# Patient Record
Sex: FEMALE | Race: WHITE | NOT HISPANIC OR LATINO | URBAN - METROPOLITAN AREA
[De-identification: names, ages, dates, MRNs, and addresses within clinical notes are randomized per-mention and may not be internally consistent; named-entity substitution may affect disease eponyms.]

---

## 2024-04-05 ENCOUNTER — OFFICE VISIT (OUTPATIENT)
Dept: CARDIOLOGY | Facility: CLINIC | Age: 44
End: 2024-04-05
Payer: COMMERCIAL

## 2024-04-05 VITALS
WEIGHT: 220 LBS | HEART RATE: 89 BPM | BODY MASS INDEX: 37.56 KG/M2 | DIASTOLIC BLOOD PRESSURE: 108 MMHG | HEIGHT: 64 IN | SYSTOLIC BLOOD PRESSURE: 158 MMHG

## 2024-04-05 DIAGNOSIS — R00.2 PALPITATIONS: Primary | ICD-10-CM

## 2024-04-05 DIAGNOSIS — R03.0 ELEVATED BLOOD PRESSURE READING: ICD-10-CM

## 2024-04-05 PROCEDURE — 93000 ELECTROCARDIOGRAM COMPLETE: CPT | Performed by: INTERNAL MEDICINE

## 2024-04-05 PROCEDURE — 1036F TOBACCO NON-USER: CPT | Performed by: INTERNAL MEDICINE

## 2024-04-05 PROCEDURE — 99204 OFFICE O/P NEW MOD 45 MIN: CPT | Performed by: INTERNAL MEDICINE

## 2024-04-05 RX ORDER — SEMAGLUTIDE 2.4 MG/.75ML
2.4 INJECTION, SOLUTION SUBCUTANEOUS
COMMUNITY
Start: 2024-02-13

## 2024-04-05 RX ORDER — NIRMATRELVIR AND RITONAVIR 300-100 MG
KIT ORAL
COMMUNITY
Start: 2023-12-17 | End: 2024-04-05 | Stop reason: WASHOUT

## 2024-04-05 ASSESSMENT — ENCOUNTER SYMPTOMS
DEPRESSION: 0
OCCASIONAL FEELINGS OF UNSTEADINESS: 0
LOSS OF SENSATION IN FEET: 0

## 2024-04-05 NOTE — PROGRESS NOTES
"Chief Complaint:   New Patient Visit (Heart flutters)     History Of Present Illness:    Nataliia Rothman is a 43 y.o. female presenting with fluttering in the chest going on for 2 months.  She states that she had checked her pulse and it would appear normal.  She would have to cough to make it go away.  Feels like the heart flip-flops.  Quite random episodes.    No family history of heart disease although grandparents had valvular heart disease.    She does not have chest pain or shortness of breath with activities.    She has elevated blood pressure today however previously blood pressure was well-controlled, in fact normal when she went to see her PCP not too long ago.  Reviewed labs done at outside facility, favorable lipid profile normal BMP CBC, TSH- labs were done on 3/7/2024 at [facility]..     Last Recorded Vitals:  Vitals:    04/05/24 1015 04/05/24 1026   BP: (!) 152/103 (!) 158/108   BP Location: Left arm Right arm   Pulse: 89    Weight: 99.8 kg (220 lb)    Height: 1.626 m (5' 4\")        Past Medical History:  She has no past medical history on file.    Past Surgical History:  She has no past surgical history on file.      Social History:  She reports that she has never smoked. She has never been exposed to tobacco smoke. She has never used smokeless tobacco. No history on file for alcohol use and drug use.    Family History:  Family History   Problem Relation Name Age of Onset    Diabetes Mother          Allergies:  Penicillins    Outpatient Medications:  Current Outpatient Medications   Medication Instructions    Wegovy 2.4 mg, oral, Once Weekly       Physical Exam:  Physical Exam  Vitals reviewed.   Constitutional:       Appearance: Normal appearance.   Neck:      Vascular: No carotid bruit or JVD.   Cardiovascular:      Rate and Rhythm: Normal rate and regular rhythm.      Pulses: Normal pulses.      Heart sounds: Normal heart sounds, S1 normal and S2 normal.   Pulmonary:      Effort: Pulmonary effort " "is normal. No respiratory distress.      Breath sounds: No wheezing or rales.   Abdominal:      General: Abdomen is flat.      Palpations: Abdomen is soft.   Musculoskeletal:      Right lower leg: No edema.      Left lower leg: No edema.   Skin:     General: Skin is warm.   Neurological:      Mental Status: She is alert and oriented to person, place, and time. Mental status is at baseline.   Psychiatric:         Mood and Affect: Mood normal.         Behavior: Behavior normal.           Last Labs:  CBC -  No results found for: \"WBC\", \"HGB\", \"HCT\", \"MCV\", \"PLT\"    CMP -  No results found for: \"CALCIUM\", \"PHOS\", \"PROT\", \"ALBUMIN\", \"AST\", \"ALT\", \"ALKPHOS\", \"BILITOT\"    LIPID PANEL -   No results found for: \"CHOL\", \"TRIG\", \"HDL\", \"CHHDL\", \"LDLF\", \"VLDL\", \"NHDL\"    RENAL FUNCTION PANEL -   No results found for: \"GLUCOSE\", \"NA\", \"K\", \"CL\", \"CO2\", \"ANIONGAP\", \"BUN\", \"CREATININE\", \"GFRMALE\", \"CALCIUM\", \"PHOS\", \"ALBUMIN\"     No results found for: \"BNP\", \"HGBA1C\"    Last Cardiology Tests:  ECG:  No results found for this or any previous visit from the past 1095 days.      Echo:  No results found for this or any previous visit from the past 1095 days.      Ejection Fractions:  No results found for: \"EF\"    Cath:  No results found for this or any previous visit from the past 1095 days.      Stress Test:  No results found for this or any previous visit from the past 1095 days.      Cardiac Imaging:  No results found for this or any previous visit from the past 1095 days.          Assessment/Plan   1-palpitations-symptoms consistent with PACs or PVCs we will arrange for a 14-day monitor and echocardiogram to rule out structural heart disease.  TSH is normal.  Physical exam is normal.    2-elevated blood pressure without diagnosis of hypertension-advised to self monitor blood pressure and bring log at the next visit.  If remains elevated may require treatment in near future.      Stephanie Soto MD  "

## 2024-04-25 ENCOUNTER — HOSPITAL ENCOUNTER (OUTPATIENT)
Dept: CARDIOLOGY | Facility: HOSPITAL | Age: 44
Discharge: HOME | End: 2024-04-25
Payer: COMMERCIAL

## 2024-04-25 DIAGNOSIS — R03.0 ELEVATED BLOOD PRESSURE READING: ICD-10-CM

## 2024-04-25 DIAGNOSIS — R00.2 PALPITATIONS: ICD-10-CM

## 2024-04-25 LAB
AORTIC VALVE MEAN GRADIENT: 6.8 MMHG
AORTIC VALVE PEAK VELOCITY: 1.76 M/S
AV PEAK GRADIENT: 12.3 MMHG
AVA (PEAK VEL): 1.76 CM2
AVA (VTI): 1.83 CM2
EJECTION FRACTION APICAL 4 CHAMBER: 64.3
LEFT ATRIUM VOLUME AREA LENGTH INDEX BSA: 25.1 ML/M2
LEFT VENTRICLE INTERNAL DIMENSION DIASTOLE: 4.59 CM (ref 3.5–6)
LEFT VENTRICULAR OUTFLOW TRACT DIAMETER: 1.95 CM
LV EJECTION FRACTION BIPLANE: 66 %
MITRAL VALVE E/A RATIO: 0.94
MITRAL VALVE E/E' RATIO: 8.71
RIGHT VENTRICLE FREE WALL PEAK S': 15.41 CM/S
TRICUSPID ANNULAR PLANE SYSTOLIC EXCURSION: 2.6 CM

## 2024-04-25 PROCEDURE — 93306 TTE W/DOPPLER COMPLETE: CPT | Performed by: INTERNAL MEDICINE

## 2024-04-25 PROCEDURE — 93306 TTE W/DOPPLER COMPLETE: CPT

## 2024-04-26 ENCOUNTER — ANCILLARY PROCEDURE (OUTPATIENT)
Dept: CARDIOLOGY | Facility: CLINIC | Age: 44
End: 2024-04-26
Payer: COMMERCIAL

## 2024-04-26 DIAGNOSIS — R00.2 PALPITATIONS: ICD-10-CM

## 2024-04-26 DIAGNOSIS — R03.0 ELEVATED BLOOD PRESSURE READING: ICD-10-CM

## 2024-04-26 PROCEDURE — 93248 EXT ECG>7D<15D REV&INTERPJ: CPT | Performed by: INTERNAL MEDICINE

## 2024-04-26 PROCEDURE — 93246 EXT ECG>7D<15D RECORDING: CPT | Performed by: INTERNAL MEDICINE

## 2024-04-29 NOTE — PROGRESS NOTES
"Primary Cardiologist: Dr. Soto    Chief Complaint:   Follow-up     History Of Present Illness:    Nataliia Rothman is a 43 y.o. female with past medical history of elevated blood pressure and palpitations who presents for follow up after 14 day Holter and echocardiogram.     Today, Nataliia Rothman is feeling well, she reports the palpitations that she was having have subsided, no cardiac concerns today.   Denies chest pain/pressure, no dizziness or lightheadedness, no shortness of breath, and no palpitations. She denies lower extremity edema, orthopnea or PND.     Last Recorded Vitals:  Visit Vitals  BP (!) 140/120 (BP Location: Left arm)   Pulse 98   Ht 1.626 m (5' 4\")   Wt 98.9 kg (218 lb)   SpO2 99%   BMI 37.42 kg/m²   Smoking Status Never   BSA 2.11 m²        Past Medical History:  She has no past medical history on file.    Past Surgical History:  She has no past surgical history on file.      Social History:  She reports that she has never smoked. She has never been exposed to tobacco smoke. She has never used smokeless tobacco. No history on file for alcohol use and drug use.    Family History:  Family History   Problem Relation Name Age of Onset    Diabetes Mother          Allergies:  Penicillins and Shellfish derived    Outpatient Medications:  Current Outpatient Medications   Medication Instructions    amLODIPine (NORVASC) 2.5 mg, oral, Daily    miSOPROStoL (Cytotec) 200 mcg tablet PLEASE SEE ATTACHED FOR DETAILED DIRECTIONS    Wegovy 2.4 mg, oral, Once Weekly         Review of Systems   Constitutional: Negative for malaise/fatigue.   HENT: Negative.     Eyes: Negative.    Cardiovascular:  Negative for chest pain, dyspnea on exertion, leg swelling, near-syncope, palpitations and syncope.   Respiratory:  Negative for shortness of breath.    Endocrine: Negative.    Hematologic/Lymphatic: Negative.    Skin: Negative.    Musculoskeletal: Negative.    Gastrointestinal: Negative.    Genitourinary: " "Negative.    Neurological:  Negative for dizziness and light-headedness.   Psychiatric/Behavioral: Negative.          Physical Exam  Vitals reviewed.   Constitutional:       Appearance: Normal appearance.   HENT:      Head: Normocephalic.      Mouth/Throat:      Mouth: Mucous membranes are moist.   Cardiovascular:      Rate and Rhythm: Normal rate and regular rhythm.      Pulses: Normal pulses.      Heart sounds: Normal heart sounds. No murmur heard.     No friction rub. No gallop.   Pulmonary:      Effort: Pulmonary effort is normal.      Breath sounds: Normal breath sounds. No wheezing, rhonchi or rales.   Abdominal:      General: Bowel sounds are normal.      Palpations: Abdomen is soft.   Musculoskeletal:         General: Normal range of motion.      Cervical back: Normal range of motion.      Right lower leg: No edema.      Left lower leg: No edema.   Skin:     General: Skin is warm and dry.   Neurological:      General: No focal deficit present.      Mental Status: She is alert and oriented to person, place, and time.   Psychiatric:         Mood and Affect: Mood normal.         Behavior: Behavior normal.         Thought Content: Thought content normal.         Judgment: Judgment normal.           Last Labs:  CBC -  No results found for: \"WBC\", \"HGB\", \"HCT\", \"MCV\", \"PLT\"    CMP -  No results found for: \"CALCIUM\", \"PHOS\", \"PROT\", \"ALBUMIN\", \"AST\", \"ALT\", \"ALKPHOS\", \"BILITOT\"    LIPID PANEL -   No results found for: \"CHOL\", \"TRIG\", \"HDL\", \"CHHDL\", \"LDLF\", \"VLDL\", \"NHDL\"    RENAL FUNCTION PANEL -   No results found for: \"GLUCOSE\", \"NA\", \"K\", \"CL\", \"CO2\", \"ANIONGAP\", \"BUN\", \"CREATININE\", \"GFRMALE\", \"CALCIUM\", \"PHOS\", \"ALBUMIN\"     No results found for: \"BNP\", \"HGBA1C\"    Last Cardiology Tests:  ECG:      Holter monitor: Patient wore monitor for 12 days and 20 hours starting on 4/6/2024 and ending on 4/19/2024: Patient had a minimum heart rate of 52 bpm, maximum heart rate of 174 bpm and average heart rate of 79 " "bpm.  Predominant underlying rhythm was sinus rhythm.  2 SVT runs occurred, the run with the fastest interval lasting 12 beats with a max rate of 174 bpm.  The run with the fastest interval was also the longest.  Isolated SVE's were rare SVE couplets were rare and SVE triplets were rare.  Isolated VE's were rare VE couplets were rare and VE triplets were rare.  Echo:  TTE 4/25/24:   Left ventricular systolic function is normal with a 60% estimated ejection fraction.     Ejection Fractions:  No results found for: \"EF\"    Cath:  No results found for this or any previous visit from the past 1095 days.      Stress Test:      Cardiac Imaging:  No results found for this or any previous visit from the past 1095 days.        Lab review: I have personally reviewed the laboratory result(s)     Assessment/Plan       Palpitations/pSVT  Holter with 2 episodes of pSVT, triggered events correlate with SR  Echo with normal LV systolic function  Today, denies palpitations today, stable.       Elevated blood pressure  Today's BP is elevated and reports that her home readings are usually running with systolic's 120-140s.   Will start on amlodipine 2.5 mg  today.   She will continue to take blood pressure readings at home and send a message with two weeks of readings after starting on amlodipine.     Ghada Villanueva, APRN-CNP   "

## 2024-05-01 ENCOUNTER — OFFICE VISIT (OUTPATIENT)
Dept: CARDIOLOGY | Facility: CLINIC | Age: 44
End: 2024-05-01
Payer: COMMERCIAL

## 2024-05-01 VITALS
HEART RATE: 98 BPM | HEIGHT: 64 IN | OXYGEN SATURATION: 99 % | SYSTOLIC BLOOD PRESSURE: 140 MMHG | WEIGHT: 218 LBS | BODY MASS INDEX: 37.22 KG/M2 | DIASTOLIC BLOOD PRESSURE: 120 MMHG

## 2024-05-01 DIAGNOSIS — R00.2 PALPITATIONS: Primary | ICD-10-CM

## 2024-05-01 DIAGNOSIS — I10 ESSENTIAL HYPERTENSION, BENIGN: ICD-10-CM

## 2024-05-01 PROCEDURE — 3080F DIAST BP >= 90 MM HG: CPT | Performed by: CLINICAL NURSE SPECIALIST

## 2024-05-01 PROCEDURE — 3077F SYST BP >= 140 MM HG: CPT | Performed by: CLINICAL NURSE SPECIALIST

## 2024-05-01 PROCEDURE — 99213 OFFICE O/P EST LOW 20 MIN: CPT | Performed by: CLINICAL NURSE SPECIALIST

## 2024-05-01 RX ORDER — MISOPROSTOL 200 UG/1
TABLET ORAL
COMMUNITY
Start: 2024-04-25

## 2024-05-01 RX ORDER — AMLODIPINE BESYLATE 2.5 MG/1
2.5 TABLET ORAL DAILY
Qty: 30 TABLET | Refills: 3 | Status: SHIPPED | OUTPATIENT
Start: 2024-05-01 | End: 2024-05-09 | Stop reason: SDUPTHER

## 2024-05-01 ASSESSMENT — ENCOUNTER SYMPTOMS
DIZZINESS: 0
HEMATOLOGIC/LYMPHATIC NEGATIVE: 1
DYSPNEA ON EXERTION: 0
PSYCHIATRIC NEGATIVE: 1
ENDOCRINE NEGATIVE: 1
LIGHT-HEADEDNESS: 0
EYES NEGATIVE: 1
SHORTNESS OF BREATH: 0
GASTROINTESTINAL NEGATIVE: 1
NEAR-SYNCOPE: 0
MUSCULOSKELETAL NEGATIVE: 1
PALPITATIONS: 0
SYNCOPE: 0

## 2024-05-01 NOTE — PATIENT INSTRUCTIONS
A new medication has been ordered for you to take daily.   Please start taking amlodipine 2.5 mg daily for your elevated blood pressure.  Take your blood pressure at home and send us a message in 2 weeks with your blood pressure readings.   Goal blood pressure would be for the top number to be less than 130.   Call our office with any new cardiac concerns  Continue current medications  Continue heart-healthy diet. A diet low in sodium, low in cholesterol, limiting red meats and eating whole foods.   Follow up with Dr. Soto in one year

## 2024-05-09 DIAGNOSIS — I10 ESSENTIAL HYPERTENSION, BENIGN: ICD-10-CM

## 2024-05-09 RX ORDER — AMLODIPINE BESYLATE 2.5 MG/1
2.5 TABLET ORAL 2 TIMES DAILY
Qty: 180 TABLET | Refills: 1 | Status: SHIPPED | OUTPATIENT
Start: 2024-05-09 | End: 2024-11-05

## 2024-05-30 ENCOUNTER — APPOINTMENT (OUTPATIENT)
Dept: CARDIOLOGY | Facility: CLINIC | Age: 44
End: 2024-05-30
Payer: COMMERCIAL

## 2024-08-23 DIAGNOSIS — I10 ESSENTIAL HYPERTENSION, BENIGN: ICD-10-CM

## 2024-09-16 RX ORDER — AMLODIPINE BESYLATE 2.5 MG/1
2.5 TABLET ORAL DAILY
Qty: 90 TABLET | Refills: 2 | Status: SHIPPED | OUTPATIENT
Start: 2024-09-16

## 2024-12-31 ENCOUNTER — PATIENT MESSAGE (OUTPATIENT)
Dept: CARDIOLOGY | Facility: HOSPITAL | Age: 44
End: 2024-12-31
Payer: COMMERCIAL

## 2024-12-31 DIAGNOSIS — I10 ESSENTIAL HYPERTENSION, BENIGN: ICD-10-CM

## 2025-01-02 RX ORDER — AMLODIPINE BESYLATE 2.5 MG/1
2.5 TABLET ORAL DAILY
Qty: 90 TABLET | Refills: 1 | Status: SHIPPED | OUTPATIENT
Start: 2025-01-02

## 2025-02-24 ENCOUNTER — TELEPHONE (OUTPATIENT)
Dept: CARDIOLOGY | Facility: HOSPITAL | Age: 45
End: 2025-02-24
Payer: COMMERCIAL

## 2025-02-24 NOTE — TELEPHONE ENCOUNTER
Called to speak with Pt about her appointment with Ghada Villanueva on 03-. LVM stating Pt had canceled her yearly appointment with Dr. Soto and wanted to notify her that her appointment with Ghada will need to be canceled and one scheduled with Dr. Soto instead. Requested that Pt call our office to reschedule.  Nesha Kimball MA

## 2025-03-04 ENCOUNTER — APPOINTMENT (OUTPATIENT)
Dept: CARDIOLOGY | Facility: HOSPITAL | Age: 45
End: 2025-03-04
Payer: COMMERCIAL

## 2025-03-07 ASSESSMENT — ENCOUNTER SYMPTOMS
SHORTNESS OF BREATH: 0
PALPITATIONS: 0
EYES NEGATIVE: 1
LIGHT-HEADEDNESS: 0
HEMATOLOGIC/LYMPHATIC NEGATIVE: 1
NEAR-SYNCOPE: 0
ENDOCRINE NEGATIVE: 1
MUSCULOSKELETAL NEGATIVE: 1
PSYCHIATRIC NEGATIVE: 1
SYNCOPE: 0
DIZZINESS: 0
DYSPNEA ON EXERTION: 0
GASTROINTESTINAL NEGATIVE: 1

## 2025-03-07 NOTE — PROGRESS NOTES
"Primary Cardiologist: Dr. Soto    Chief Complaint:   No chief complaint on file.     History Of Present Illness:    Nataliia Rothman is a 44 y.o. female with past medical history of elevated blood pressure and palpitations who presents for blood pressure monitoring.     Today, Nataliia Rothman is feeling well overall, no cardiac concerns, no recent hospitalizations. She brought in her blood pressure readings which are very well controlled with SBP 90-130mmHg, avg 115mmHg.   Denies chest pain/pressure, no dizziness or lightheadedness, no shortness of breath, and no palpitations. She denies lower extremity edema, orthopnea or PND.     Last Recorded Vitals:  Visit Vitals  /88 (BP Location: Left arm, Patient Position: Sitting, BP Cuff Size: Adult)   Pulse 87   Ht 1.626 m (5' 4\")   Wt 98.9 kg (218 lb)   SpO2 99%   BMI 37.42 kg/m²   Smoking Status Never   BSA 2.11 m²        Past Medical History:  She has no past medical history on file.    Past Surgical History:  She has no past surgical history on file.      Social History:  She reports that she has never smoked. She has never been exposed to tobacco smoke. She has never used smokeless tobacco. No history on file for alcohol use and drug use.    Family History:  Family History   Problem Relation Name Age of Onset    Diabetes Mother          Allergies:  Penicillins and Shellfish derived    Outpatient Medications:  Current Outpatient Medications   Medication Instructions    amLODIPine (NORVASC) 2.5 mg, oral, Daily    Wegovy 2.4 mg, Once Weekly         Review of Systems   Constitutional: Negative for malaise/fatigue.   HENT: Negative.     Eyes: Negative.    Cardiovascular:  Negative for chest pain, dyspnea on exertion, leg swelling, near-syncope, palpitations and syncope.   Respiratory:  Negative for shortness of breath.    Endocrine: Negative.    Hematologic/Lymphatic: Negative.    Skin: Negative.    Musculoskeletal: Negative.    Gastrointestinal: Negative.  " "  Genitourinary: Negative.    Neurological:  Negative for dizziness and light-headedness.   Psychiatric/Behavioral: Negative.          Physical Exam  Vitals reviewed.   Constitutional:       Appearance: Normal appearance.   HENT:      Head: Normocephalic.      Mouth/Throat:      Mouth: Mucous membranes are moist.   Cardiovascular:      Rate and Rhythm: Normal rate and regular rhythm.      Pulses: Normal pulses.      Heart sounds: Normal heart sounds. No murmur heard.     No friction rub. No gallop.   Pulmonary:      Effort: Pulmonary effort is normal.      Breath sounds: Normal breath sounds. No wheezing, rhonchi or rales.   Abdominal:      General: Bowel sounds are normal.      Palpations: Abdomen is soft.   Musculoskeletal:         General: Normal range of motion.      Cervical back: Normal range of motion.      Right lower leg: No edema.      Left lower leg: No edema.   Skin:     General: Skin is warm and dry.   Neurological:      General: No focal deficit present.      Mental Status: She is alert and oriented to person, place, and time.   Psychiatric:         Mood and Affect: Mood normal.         Behavior: Behavior normal.         Thought Content: Thought content normal.         Judgment: Judgment normal.           Last Labs:  CBC -  No results found for: \"WBC\", \"HGB\", \"HCT\", \"MCV\", \"PLT\"    CMP -  No results found for: \"CALCIUM\", \"PHOS\", \"PROT\", \"ALBUMIN\", \"AST\", \"ALT\", \"ALKPHOS\", \"BILITOT\"    LIPID PANEL -   No results found for: \"CHOL\", \"TRIG\", \"HDL\", \"CHHDL\", \"LDLF\", \"VLDL\", \"NHDL\"    RENAL FUNCTION PANEL -   No results found for: \"GLUCOSE\", \"NA\", \"K\", \"CL\", \"CO2\", \"ANIONGAP\", \"BUN\", \"CREATININE\", \"GFRMALE\", \"CALCIUM\", \"PHOS\", \"ALBUMIN\"     No results found for: \"BNP\", \"HGBA1C\"    Last Cardiology Tests:  ECG:      Holter monitor: Patient wore monitor for 12 days and 20 hours starting on 4/6/2024 and ending on 4/19/2024: Patient had a minimum heart rate of 52 bpm, maximum heart rate of 174 bpm and average " "heart rate of 79 bpm.  Predominant underlying rhythm was sinus rhythm.  2 SVT runs occurred, the run with the fastest interval lasting 12 beats with a max rate of 174 bpm.  The run with the fastest interval was also the longest.  Isolated SVE's were rare SVE couplets were rare and SVE triplets were rare.  Isolated VE's were rare VE couplets were rare and VE triplets were rare.      Echo:  TTE 4/25/24:   Left ventricular systolic function is normal with a 60% estimated ejection fraction.     Ejection Fractions:  No results found for: \"EF\"    Cath:  No results found for this or any previous visit from the past 1095 days.      Stress Test:      Cardiac Imaging:  No results found for this or any previous visit from the past 1095 days.        Lab review: I have personally reviewed the laboratory result(s)     Assessment/Plan   Nataliia Rothman is a 44 y.o. female with past medical history of elevated blood pressure and palpitations who presents for blood pressure monitoring.     Palpitations/pSVT  Holter with 2 episodes of pSVT, triggered events correlate with SR  Echo with normal LV systolic function  Today, denies palpitations today, stable.       Elevated blood pressure  Today's BP is 132/88  At last office visit, patient was started on amlodipine 2.5 mg   She has been taking amlodipine 2.5 mg twice daily and blood pressures are well controlled with avg SBP ~115 mmHg.   Continue on amlodipine 2.5 mg twice daily    Ghada Villanueva, SAMARIA-CNP   "

## 2025-03-10 ENCOUNTER — OFFICE VISIT (OUTPATIENT)
Dept: CARDIOLOGY | Facility: HOSPITAL | Age: 45
End: 2025-03-10
Payer: COMMERCIAL

## 2025-03-10 VITALS
WEIGHT: 218 LBS | HEIGHT: 64 IN | HEART RATE: 87 BPM | BODY MASS INDEX: 37.22 KG/M2 | DIASTOLIC BLOOD PRESSURE: 88 MMHG | OXYGEN SATURATION: 99 % | SYSTOLIC BLOOD PRESSURE: 132 MMHG

## 2025-03-10 DIAGNOSIS — R00.2 PALPITATIONS: ICD-10-CM

## 2025-03-10 DIAGNOSIS — I10 ESSENTIAL HYPERTENSION, BENIGN: Primary | ICD-10-CM

## 2025-03-10 PROCEDURE — 3008F BODY MASS INDEX DOCD: CPT | Performed by: CLINICAL NURSE SPECIALIST

## 2025-03-10 PROCEDURE — 3079F DIAST BP 80-89 MM HG: CPT | Performed by: CLINICAL NURSE SPECIALIST

## 2025-03-10 PROCEDURE — 1036F TOBACCO NON-USER: CPT | Performed by: CLINICAL NURSE SPECIALIST

## 2025-03-10 PROCEDURE — 99213 OFFICE O/P EST LOW 20 MIN: CPT | Performed by: CLINICAL NURSE SPECIALIST

## 2025-03-10 PROCEDURE — 3075F SYST BP GE 130 - 139MM HG: CPT | Performed by: CLINICAL NURSE SPECIALIST

## 2025-03-10 RX ORDER — AMLODIPINE BESYLATE 2.5 MG/1
2.5 TABLET ORAL 2 TIMES DAILY
Qty: 180 TABLET | Refills: 3 | Status: SHIPPED | OUTPATIENT
Start: 2025-03-10 | End: 2026-03-10

## 2025-03-10 NOTE — PATIENT INSTRUCTIONS
Continue on amlodipine 2.5 mg two times daily, please call if you start to experience dizziness or lightheadedness.   Call our office with any new cardiac concerns  Continue current medications  Continue heart-healthy diet. A diet low in sodium, low in cholesterol, limiting red meats and eating whole foods.   Follow up with Dr. Soto in May as scheduled

## 2025-03-11 ENCOUNTER — APPOINTMENT (OUTPATIENT)
Dept: CARDIOLOGY | Facility: HOSPITAL | Age: 45
End: 2025-03-11
Payer: COMMERCIAL

## 2025-05-01 ENCOUNTER — APPOINTMENT (OUTPATIENT)
Dept: CARDIOLOGY | Facility: CLINIC | Age: 45
End: 2025-05-01
Payer: COMMERCIAL

## 2025-05-01 VITALS
SYSTOLIC BLOOD PRESSURE: 140 MMHG | HEIGHT: 64 IN | OXYGEN SATURATION: 99 % | HEART RATE: 88 BPM | BODY MASS INDEX: 40.63 KG/M2 | WEIGHT: 238 LBS | DIASTOLIC BLOOD PRESSURE: 88 MMHG

## 2025-05-01 DIAGNOSIS — Z83.3 FAMILY HISTORY OF DIABETES MELLITUS: ICD-10-CM

## 2025-05-01 DIAGNOSIS — R00.2 PALPITATIONS: ICD-10-CM

## 2025-05-01 DIAGNOSIS — I47.10 SVT (SUPRAVENTRICULAR TACHYCARDIA) (CMS-HCC): Primary | ICD-10-CM

## 2025-05-01 DIAGNOSIS — I10 ESSENTIAL HYPERTENSION, BENIGN: ICD-10-CM

## 2025-05-01 PROBLEM — R03.0 ELEVATED BLOOD PRESSURE READING: Status: RESOLVED | Noted: 2024-04-05 | Resolved: 2025-05-01

## 2025-05-01 PROCEDURE — 99214 OFFICE O/P EST MOD 30 MIN: CPT | Mod: 25 | Performed by: INTERNAL MEDICINE

## 2025-05-01 PROCEDURE — 99214 OFFICE O/P EST MOD 30 MIN: CPT | Performed by: INTERNAL MEDICINE

## 2025-05-01 PROCEDURE — 3077F SYST BP >= 140 MM HG: CPT | Performed by: INTERNAL MEDICINE

## 2025-05-01 PROCEDURE — 3079F DIAST BP 80-89 MM HG: CPT | Performed by: INTERNAL MEDICINE

## 2025-05-01 PROCEDURE — 1036F TOBACCO NON-USER: CPT | Performed by: INTERNAL MEDICINE

## 2025-05-01 PROCEDURE — 3008F BODY MASS INDEX DOCD: CPT | Performed by: INTERNAL MEDICINE

## 2025-05-01 RX ORDER — TIRZEPATIDE 7.5 MG/.5ML
INJECTION, SOLUTION SUBCUTANEOUS
COMMUNITY
Start: 2025-04-16

## 2025-05-01 RX ORDER — VIT C/E/ZN/COPPR/LUTEIN/ZEAXAN 250MG-90MG
25 CAPSULE ORAL
COMMUNITY
Start: 2025-04-15

## 2025-05-01 RX ORDER — HYDROCHLOROTHIAZIDE 25 MG/1
12.5 TABLET ORAL DAILY
Qty: 45 TABLET | Refills: 3 | Status: SHIPPED | OUTPATIENT
Start: 2025-05-01 | End: 2026-05-01

## 2025-05-01 RX ORDER — AMLODIPINE BESYLATE 2.5 MG/1
2.5 TABLET ORAL DAILY
Qty: 90 TABLET | Refills: 3 | Status: SHIPPED | OUTPATIENT
Start: 2025-05-01 | End: 2026-05-01

## 2025-05-01 NOTE — PROGRESS NOTES
"Chief Complaint:   Follow-up (2 month with results, edema)     History Of Present Illness:    Nataliia Rothman is a 44 y.o. female presenting follow-up of hypertension and palpitations.    Symptoms of palpitation have now subsided.  She showed me her home blood pressure log and her diastolic blood pressure is often over 80 although systolic is well-controlled.  She states she is having some ankle swelling and fluid retention with the amlodipine.    We had performed Holter monitor at the last visit in 2024.  It showed less than 1% PACs and PVCs.  There was a short run of SVT that she did not feel.  Her symptoms correlated with sinus rhythm.  She was reassured.     No family history of heart disease although grandparents had valvular heart disease.  Mother has diabetes mellitus.     She does not have chest pain or shortness of breath with activities.     Reviewed labs done at outside facility, favorable lipid profile normal BMP CBC, TSH- labs were done on 3/7/2024 at [facility]     Last Recorded Vitals:  Vitals:    05/01/25 1132   BP: 140/88   BP Location: Left arm   Pulse: 88   SpO2: 99%   Weight: 108 kg (238 lb)   Height: 1.626 m (5' 4\")       Past Medical History:  She has no past medical history on file.    Past Surgical History:  She has no past surgical history on file.      Social History:  She reports that she has never smoked. She has never been exposed to tobacco smoke. She has never used smokeless tobacco. No history on file for alcohol use and drug use.    Family History:  Family History[1]     Allergies:  Penicillins and Shellfish derived    Outpatient Medications:  Current Outpatient Medications   Medication Instructions    amLODIPine (NORVASC) 2.5 mg, oral, 2 times daily    cholecalciferol (VITAMIN D-3) 25 mcg, Daily RT    Wegovy 2.4 mg, Once Weekly    Zepbound 7.5 mg/0.5 mL injection ADMINISTER 7.5 MG UNDER THE SKIN 1 TIME EVERY WEEK       Physical Exam:  Physical Exam  Vitals reviewed.   Constitutional: " "      Appearance: Normal appearance.   Neck:      Vascular: No carotid bruit or JVD.   Cardiovascular:      Rate and Rhythm: Normal rate and regular rhythm.      Pulses: Normal pulses.      Heart sounds: Normal heart sounds, S1 normal and S2 normal.   Pulmonary:      Effort: Pulmonary effort is normal. No respiratory distress.      Breath sounds: No wheezing or rales.   Abdominal:      General: Abdomen is flat.      Palpations: Abdomen is soft.   Musculoskeletal:      Right lower leg: No edema.      Left lower leg: No edema.   Skin:     General: Skin is warm.   Neurological:      Mental Status: She is alert and oriented to person, place, and time. Mental status is at baseline.   Psychiatric:         Mood and Affect: Mood normal.         Behavior: Behavior normal.           Last Labs:  CBC -  No results found for: \"WBC\", \"HGB\", \"HCT\", \"MCV\", \"PLT\"    CMP -  No results found for: \"CALCIUM\", \"PHOS\", \"PROT\", \"ALBUMIN\", \"AST\", \"ALT\", \"ALKPHOS\", \"BILITOT\"    LIPID PANEL -   No results found for: \"CHOL\", \"TRIG\", \"HDL\", \"CHHDL\", \"LDLF\", \"VLDL\", \"NHDL\"    RENAL FUNCTION PANEL -   No results found for: \"GLUCOSE\", \"NA\", \"K\", \"CL\", \"CO2\", \"ANIONGAP\", \"BUN\", \"CREATININE\", \"GFRMALE\", \"CALCIUM\", \"PHOS\", \"ALBUMIN\"     No results found for: \"BNP\", \"HGBA1C\"    Last Cardiology Tests:  ECG:  ECG 12 Lead 04/05/2024      Echo:  Transthoracic echo (TTE) complete 04/25/2024      Ejection Fractions:  No results found for: \"EF\"    Cath:  No results found for this or any previous visit from the past 1095 days.      Stress Test:  No results found for this or any previous visit from the past 1095 days.      Cardiac Imaging:  No results found for this or any previous visit from the past 1095 days.          Assessment/Plan   1-palpitations-symptoms mostly subsided, Holter monitor did not show significant arrhythmia other than the SVT.  I advised her that if she symptoms last more than 10-15 minutes to try to record a ECG with her smart watch.  " Echo with structurally normal heart.  She was reassured.  TSH is normal.  Physical exam is normal.     2-hypertension-appears she has hypertension, currently will need antihypertensives for blood pressure management but if she loses substantial amount of weight with Zepbound may be able to come off the medical regimen.  Her target blood pressure is less than 130/80.  Self monitoring advised.  Reduce Norvasc dose as she is having edema and is not appropriately controlling blood pressure.  Add low-dose hydrochlorothiazide which may be increased if needed if blood pressure remains above threshold.  Check BMP in a week or 2 and advised her to adhere to a high potassium diet while she is on these medications.    3-family history of coronary artery disease and other risk factors including BMI of 40 and hypertension family history of diabetes-check hemoglobin A1c and lipid profile and CT calcium score.      Stephanie Soto MD         [1]   Family History  Problem Relation Name Age of Onset    Diabetes Mother

## 2025-05-15 DIAGNOSIS — Z83.3 FAMILY HISTORY OF DIABETES MELLITUS: ICD-10-CM

## 2025-05-15 DIAGNOSIS — R00.2 PALPITATIONS: ICD-10-CM

## 2025-05-15 DIAGNOSIS — I47.10 SVT (SUPRAVENTRICULAR TACHYCARDIA): ICD-10-CM

## 2025-05-15 DIAGNOSIS — I10 ESSENTIAL HYPERTENSION, BENIGN: ICD-10-CM
